# Patient Record
Sex: FEMALE | Race: WHITE
[De-identification: names, ages, dates, MRNs, and addresses within clinical notes are randomized per-mention and may not be internally consistent; named-entity substitution may affect disease eponyms.]

---

## 2021-09-28 ENCOUNTER — HOSPITAL ENCOUNTER (OUTPATIENT)
Dept: HOSPITAL 11 - JP.SDS | Age: 66
Discharge: HOME | End: 2021-09-28
Attending: SURGERY
Payer: MEDICARE

## 2021-09-28 DIAGNOSIS — K57.30: ICD-10-CM

## 2021-09-28 DIAGNOSIS — K63.5: ICD-10-CM

## 2021-09-28 DIAGNOSIS — Z12.11: Primary | ICD-10-CM

## 2021-09-28 NOTE — OR
DATE OF PROCEDURE:  09/28/2021

 

SURGEON:  Lb Lopez MD

 

PROCEDURE PERFORMED:  Colonoscopy.

 

FINDINGS:

1. Ascending colon polyp, approximately 8 mm, completely removed using endoscopic mucosal

    resection.

2. Transverse colon polyp, 8 mm, completely removed using hot snare wire device.

 

COMPLICATIONS:  None.

 

ASSISTANT:  None.

 

ANESTHESIA:  MAC.

 

PREOPERATIVE DIAGNOSIS:  Screening colonoscopy.

 

POSTOPERATIVE DIAGNOSIS:  Screening colonoscopy.

 

RISKS:  Risks, benefits, alternatives, and limitations including, but not limited to

infection, bleeding, perforation, false positives, and false negatives were explained to the

patient, and they wished to proceed.

 

PROCEDURE IN DETAIL:  The patient was placed in the left lateral decubitus position.

Digital rectal exam was performed without abnormality.  The scope was introduced and

advanced atraumatically to the ileocecal valve.  A photo was taken of the appendiceal

orifice.  The scope was brought back to the ascending, transverse, and descending colon and

retroflexed.

 

The aforementioned polyps were identified and completely removed as described above.  The

first polyp in the ascending area was resected using endoscopic mucosal resection using

Marli ink as the elevator agent in all 4 quadrants.  No abnormal bleeding was noted after

removal of these.  The patient had diverticulosis, described as very mild with only a few

tics noted.  No abnormalities on retroflex.  Greater than 8 minutes spent on removing the

scope.  The patient tolerated the procedure well.

 

 

 

 

Lb Lopez MD

DD:  09/28/2021 08:15:17

DT:  09/28/2021 14:22:21

Job #:  [id number]